# Patient Record
Sex: MALE | Race: WHITE | HISPANIC OR LATINO | Employment: FULL TIME | URBAN - METROPOLITAN AREA
[De-identification: names, ages, dates, MRNs, and addresses within clinical notes are randomized per-mention and may not be internally consistent; named-entity substitution may affect disease eponyms.]

---

## 2022-07-19 ENCOUNTER — HOSPITAL ENCOUNTER (EMERGENCY)
Facility: HOSPITAL | Age: 32
Discharge: HOME | End: 2022-07-20
Attending: EMERGENCY MEDICINE

## 2022-07-19 VITALS
DIASTOLIC BLOOD PRESSURE: 90 MMHG | HEIGHT: 72 IN | HEART RATE: 64 BPM | WEIGHT: 160 LBS | SYSTOLIC BLOOD PRESSURE: 134 MMHG | TEMPERATURE: 98 F | BODY MASS INDEX: 21.67 KG/M2 | OXYGEN SATURATION: 98 % | RESPIRATION RATE: 20 BRPM

## 2022-07-19 DIAGNOSIS — T78.40XA ALLERGIC REACTION, INITIAL ENCOUNTER: Primary | ICD-10-CM

## 2022-07-19 DIAGNOSIS — T14.8XXA WOUND INFECTION: ICD-10-CM

## 2022-07-19 DIAGNOSIS — L08.9 WOUND INFECTION: ICD-10-CM

## 2022-07-19 PROCEDURE — 25000003 PHARM REV CODE 250: Performed by: EMERGENCY MEDICINE

## 2022-07-19 PROCEDURE — 96372 THER/PROPH/DIAG INJ SC/IM: CPT | Performed by: EMERGENCY MEDICINE

## 2022-07-19 PROCEDURE — 99284 EMERGENCY DEPT VISIT MOD MDM: CPT | Mod: 25

## 2022-07-19 PROCEDURE — 63600175 PHARM REV CODE 636 W HCPCS: Performed by: EMERGENCY MEDICINE

## 2022-07-19 RX ORDER — DIPHENHYDRAMINE HYDROCHLORIDE 50 MG/ML
50 INJECTION INTRAMUSCULAR; INTRAVENOUS
Status: COMPLETED | OUTPATIENT
Start: 2022-07-19 | End: 2022-07-19

## 2022-07-19 RX ORDER — CEPHALEXIN 500 MG/1
500 CAPSULE ORAL
Status: COMPLETED | OUTPATIENT
Start: 2022-07-19 | End: 2022-07-19

## 2022-07-19 RX ORDER — METHYLPREDNISOLONE SOD SUCC 125 MG
125 VIAL (EA) INJECTION
Status: COMPLETED | OUTPATIENT
Start: 2022-07-19 | End: 2022-07-19

## 2022-07-19 RX ORDER — DIPHENHYDRAMINE HYDROCHLORIDE 50 MG/ML
50 INJECTION INTRAMUSCULAR; INTRAVENOUS
Status: DISCONTINUED | OUTPATIENT
Start: 2022-07-19 | End: 2022-07-19

## 2022-07-19 RX ADMIN — DIPHENHYDRAMINE HYDROCHLORIDE 50 MG: 50 INJECTION INTRAMUSCULAR; INTRAVENOUS at 11:07

## 2022-07-19 RX ADMIN — CEPHALEXIN 500 MG: 500 CAPSULE ORAL at 10:07

## 2022-07-19 RX ADMIN — METHYLPREDNISOLONE SODIUM SUCCINATE 125 MG: 125 INJECTION, POWDER, FOR SOLUTION INTRAMUSCULAR; INTRAVENOUS at 10:07

## 2022-07-20 RX ORDER — CEPHALEXIN 250 MG/1
500 CAPSULE ORAL EVERY 6 HOURS
Qty: 40 CAPSULE | Refills: 0 | Status: SHIPPED | OUTPATIENT
Start: 2022-07-20 | End: 2022-07-25

## 2022-07-20 RX ORDER — HYDROCORTISONE 1 %
CREAM (GRAM) TOPICAL
Qty: 30 G | Refills: 0 | Status: SHIPPED | OUTPATIENT
Start: 2022-07-20

## 2022-07-20 RX ORDER — PREDNISONE 20 MG/1
40 TABLET ORAL DAILY
Qty: 8 TABLET | Refills: 0 | Status: SHIPPED | OUTPATIENT
Start: 2022-07-20 | End: 2022-07-24

## 2022-07-20 RX ORDER — DIPHENHYDRAMINE HCL 25 MG
25 CAPSULE ORAL EVERY 6 HOURS PRN
Qty: 20 CAPSULE | Refills: 0 | OUTPATIENT
Start: 2022-07-20 | End: 2023-09-06

## 2022-07-20 NOTE — ED PROVIDER NOTES
Encounter Date: 7/19/2022       History     Chief Complaint   Patient presents with    Rash     C/o rash to abdomen, chest, and bilateral upper and lower extremities x 4 days. Multiple reddened, blistered areas w/ swelling noted. 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 Denies pain. No medication taken at home.     Isaac Mullins is a 32 y.o. male who  has no past medical history on file.    The patient presents to the ED due to an itchy rash to abdomen chest and bilateral upper and lower extremities.  This has been going on for the past 5 days.  He has not tried anything for his symptoms at home.  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  He reports no other symptoms today no fever vomiting chills or any other concerns.    The history is provided by the patient (A  was offered at no cost and declined by the patient).     Review of patient's allergies indicates:  No Known Allergies  No past medical history on file.  No past surgical history on file.  No family history on file.     Review of Systems   Constitutional: Negative for fever.   HENT: Negative for sore throat.    Respiratory: Negative for shortness of breath.    Cardiovascular: Negative for chest pain.   Gastrointestinal: Negative for nausea.   Genitourinary: Negative for dysuria.   Musculoskeletal: Negative for back pain.   Skin: Positive for rash.   Neurological: Negative for weakness.   Hematological: Does not bruise/bleed easily.       Physical Exam     Initial Vitals [07/19/22 2149]   BP Pulse Resp Temp SpO2   (!) 134/90 64 20 98.1 °F (36.7 °C) 98 %      MAP       --         Physical Exam    Nursing note and vitals reviewed.  Constitutional: He appears well-developed and well-nourished. He is not diaphoretic. No distress.   HENT:   Head: Normocephalic and atraumatic.   Eyes: Conjunctivae are normal.   Cardiovascular: Regular rhythm and intact  distal pulses.     Neurological: He is alert.   Skin: Skin is warm and dry. Capillary refill takes less than 2 seconds. No rash noted.   Macular papular erythematous rashes to bilateral lower and upper extremities some areas have weeping  Area of increased induration to his right lower extremity   Psychiatric: He has a normal mood and affect.         ED Course   Procedures  Labs Reviewed - No data to display       Imaging Results    None          Medications   methylPREDNISolone sodium succinate injection 125 mg (125 mg Intramuscular Given 7/19/22 2245)   cephALEXin capsule 500 mg (500 mg Oral Given 7/19/22 2245)   diphenhydrAMINE injection 50 mg (50 mg Intramuscular Given 7/19/22 2330)     Medical Decision Making:   Initial Assessment:   Patient presents with pruritic rash for 5 days.  Vital signs are stable.  No signs of anaphylaxis.  He has some areas that appear to be weeping appears he also has some areas which appear more indurated and warm.  His exam is consistent with a nonspecific allergic reaction with some areas that might be starting to develop in to cellulitis.  Will plan to treat with Keflex IM Benadryl and IM methylprednisolone and reassess  Differential Diagnosis:   Differential Diagnosis includes, but is not limited to:  Necrotizing fasciitis, erythema multiforme, Page-Salomon syndrome, toxic epidermal necrolysis, DIC, cellulitis, Staph scalded skin syndrome, toxic shock syndrome, secondary syphilis, abscess, osteomyelitis, septic joint, MRSA, DVT, superficial thrombophlebitis, varicose vein, drug eruption, allergic reaction/urticatia, irritant/contact dermatitis, viral exanthem, local trauma/contusion, abrasion.    ED Management:  On reassessment patient is in no apparent distress he reports itching has improved.  Visual inspection reveals areas of decreased redness.  No emergent process has been identified today.  Will plan to discharge with Benadryl hydrocortisone cream and prednisone for an  allergic dermatitis and Keflex for his right lower extremity which appears to be under the cellulitis secondary to him scratching the area.  I advised he follow up in 2 days with his PCP/ED for wound check and return precautions for worsening symptoms or any other concerns.    After taking into careful account the historical factors and physical exam findings of the patient's presentation today, in conjunction with the empirical and objective data obtained on ED workup, no acute emergent medical condition has been identified. The patient appears to be low risk for an emergent medical condition and I feel it is safe and appropriate at this time for the patient to be discharged to follow-up as detailed in their discharge instructions for reevaluation and possible continued outpatient workup and management. I have discussed the specifics of the workup with the patient and the patient has verbalized understanding of the details of the workup, the diagnosis, the treatment plan, and the need for outpatient follow-up.  Although the patient has no emergent etiology today this does not preclude the development of an emergent condition so in addition, I have advised the patient that they can return to the ED and/or activate EMS at any time with worsening of their symptoms, change of their symptoms, or with any other medical complaint.  The patient remained comfortable and stable during their visit in the ED.  Discharge and follow-up instructions discussed with the patient who expressed understanding and willingness to comply with my recommendations.                        Clinical Impression:   Final diagnoses:  [T78.40XA] Allergic reaction, initial encounter (Primary)  [T14.8XXA, L08.9] Wound infection          ED Disposition Condition    Discharge Stable        ED Prescriptions     Medication Sig Dispense Start Date End Date Auth. Provider    predniSONE (DELTASONE) 20 MG tablet Take 2 tablets (40 mg total) by mouth once  daily. for 4 days 8 tablet 7/20/2022 7/24/2022 Franco Quiroz Jr., MD    diphenhydrAMINE (BENADRYL) 25 mg capsule Take 1 capsule (25 mg total) by mouth every 6 (six) hours as needed for Itching. 20 capsule 7/20/2022  Franco Quiroz Jr., MD    hydrocortisone 1 % cream Apply to affected area 2 times daily 30 g 7/20/2022  Franco Quiroz Jr., MD    cephALEXin (KEFLEX) 250 MG capsule Take 2 capsules (500 mg total) by mouth every 6 (six) hours. for 5 days 40 capsule 7/20/2022 7/25/2022 Franco Quiroz Jr., MD        Follow-up Information     Follow up With Specialties Details Why Contact Info Additional Information    [facility] Emergency Dept Emergency Medicine In 2 days For wound re-check [address] [address]  [facility] [id number] [phone]     [facility] Emergency Dept Emergency Medicine  As needed, If symptoms worsen [address] [address] [facility] [address]  [facility] [id number] [phone]     [facility] Family Medicine  As needed or your  [address]  [facility] [address] [phone] Please park in [address] or D and use Isa cannon. Take Medical Office Bldg. elevators.        Portions of this note were dictated using voice recognition software and may contain dictation related errors in spelling/grammar/syntax not found on text review       Franco Quiroz Jr., MD  07/20/22 8366

## 2023-09-06 ENCOUNTER — HOSPITAL ENCOUNTER (EMERGENCY)
Facility: HOSPITAL | Age: 33
Discharge: HOME | End: 2023-09-06
Attending: EMERGENCY MEDICINE

## 2023-09-06 VITALS
BODY MASS INDEX: 27.2 KG/M2 | OXYGEN SATURATION: 99 % | HEART RATE: 68 BPM | TEMPERATURE: 98 F | RESPIRATION RATE: 16 BRPM | HEIGHT: 70 IN | SYSTOLIC BLOOD PRESSURE: 129 MMHG | DIASTOLIC BLOOD PRESSURE: 83 MMHG | WEIGHT: 190 LBS

## 2023-09-06 DIAGNOSIS — R21 RASH: Primary | ICD-10-CM

## 2023-09-06 PROCEDURE — 99284 EMERGENCY DEPT VISIT MOD MDM: CPT

## 2023-09-06 RX ORDER — PREDNISONE 20 MG/1
40 TABLET ORAL DAILY
Qty: 8 TABLET | Refills: 0 | Status: SHIPPED | OUTPATIENT
Start: 2023-09-06 | End: 2023-09-10

## 2023-09-06 RX ORDER — CEPHALEXIN 500 MG/1
500 CAPSULE ORAL EVERY 8 HOURS
Qty: 15 CAPSULE | Refills: 0 | Status: SHIPPED | OUTPATIENT
Start: 2023-09-06 | End: 2023-09-11

## 2023-09-06 RX ORDER — DIPHENHYDRAMINE HCL 25 MG
25 CAPSULE ORAL EVERY 6 HOURS PRN
Qty: 20 CAPSULE | Refills: 0 | Status: SHIPPED | OUTPATIENT
Start: 2023-09-06

## 2023-09-07 NOTE — ED TRIAGE NOTES
Rash to bilateral arms, bilateral posterior lower legs and abdomen x 3 weeks. 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     Review of patient's allergies indicates:  No Known Allergies     Patient has verified the spelling of their name and  on armband.   APPEARANCE: Patient is alert, calm, oriented x 4, and does not appear distressed.  SKIN: Skin is normal for race, warm, and dry. Normal skin turgor and mucous membranes moist. +rash to bilateral arms, posterior legs, abdomen, +itching

## 2023-09-07 NOTE — ED PROVIDER NOTES
Encounter Date: 9/6/2023       History     Chief Complaint   Patient presents with    Rash     Pt advised rash for a few weeks used OTC cream with no help. 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  He reports he has had significant history of similar rashes occurring after his exposure to insulation.  He has tried topical creams with no improvement.  No tongue or throat swelling, cough or shortness of breath, abdominal pain, nausea, vomiting, fevers or chills.  No other acute complaints at this time.    The history is provided by the patient. The history is limited by a language barrier. A  was used (Gisela.  Patient deferring offer for free ).     Review of patient's allergies indicates:  No Known Allergies  No past medical history on file.  No past surgical history on file.  No family history on file.     Review of Systems   Constitutional:  Negative for chills and fever.   HENT:  Negative for facial swelling, mouth sores and trouble swallowing.    Respiratory:  Negative for cough and shortness of breath.    Cardiovascular:  Negative for chest pain.   Gastrointestinal:  Negative for abdominal pain, nausea and vomiting.   Skin:  Positive for rash.       Physical Exam     Initial Vitals [09/06/23 1913]   BP Pulse Resp Temp SpO2   129/83 68 16 98.4 °F (36.9 °C) 99 %      MAP       --         Physical Exam    Nursing note and vitals reviewed.  Constitutional: He appears well-developed and well-nourished. He is active. He does not have a sickly appearance. He does not appear ill. No distress.   HENT:   Head: Normocephalic and atraumatic.   Neck:   Normal range of motion.  Musculoskeletal:      Cervical back: Normal range of motion.     Neurological: He is alert. GCS eye subscore is 4. GCS verbal subscore is 5.  GCS motor subscore is 6.   Skin: Skin is warm and dry.   Generalized maculopapular rash noted to upper and lower extremities were skin was exposed to insulation.  Few areas noted have surrounding erythema but no large weeping wounds.   Psychiatric: He has a normal mood and affect. His speech is normal and behavior is normal.         ED Course   Procedures  Labs Reviewed - No data to display       Imaging Results    None          Medications - No data to display  Medical Decision Making  Patient reports with concern of rash after exposure to insulation, admitting to similar rash after previous exposure.  No tongue or throat swelling, shortness of breath.  Afebrile with vitals WNL.  Generalized maculopapular rash noted to extremities.  No signs of anaphylaxis.    DDx:  Including but not limited to rash, allergy/irritant, contact dermatitis, less likely anaphylaxis    No current exam findings to suggest anaphylaxis.  High concern for allergic reaction.  Prescription for Benadryl and prednisone.  He was also given prescription for Keflex as some areas do have surrounding erythema.  Encouraged to continue monitoring area closely with PCP follow-up.  ED return precautions were discussed at length.  Patient states his understanding and agrees with plan.                               Clinical Impression:   Final diagnoses:  [R21] Rash (Primary)        ED Disposition Condition    Discharge Stable          ED Prescriptions       Medication Sig Dispense Start Date End Date Auth. Provider    diphenhydrAMINE (BENADRYL) 25 mg capsule Take 1 capsule (25 mg total) by mouth every 6 (six) hours as needed for Itching or Allergies. 20 capsule 9/6/2023 -- Sanjay Mckeon PA-C    predniSONE (DELTASONE) 20 MG tablet Take 2 tablets (40 mg total) by mouth once daily. for 4 days 8 tablet 9/6/2023 9/10/2023 Sanjay Mckeon PA-C    cephALEXin (KEFLEX) 500 MG capsule Take 1 capsule (500 mg total) by mouth every 8 (eight) hours. for 5 days 15  capsule 9/6/2023 9/11/2023 Sanjay Mckeon PA-C          Follow-up Information       Follow up With Specialties Details Why Contact Info    Your Doctor                 Sanjay Mckeon PA-C  09/06/23 6375

## 2023-09-07 NOTE — DISCHARGE INSTRUCTIONS
